# Patient Record
Sex: FEMALE | Race: WHITE | NOT HISPANIC OR LATINO | Employment: OTHER | URBAN - METROPOLITAN AREA
[De-identification: names, ages, dates, MRNs, and addresses within clinical notes are randomized per-mention and may not be internally consistent; named-entity substitution may affect disease eponyms.]

---

## 2018-07-07 ENCOUNTER — OFFICE VISIT (OUTPATIENT)
Dept: URGENT CARE | Facility: PHYSICIAN GROUP | Age: 58
End: 2018-07-07
Payer: COMMERCIAL

## 2018-07-07 VITALS
HEIGHT: 66 IN | TEMPERATURE: 99.5 F | SYSTOLIC BLOOD PRESSURE: 104 MMHG | WEIGHT: 135 LBS | DIASTOLIC BLOOD PRESSURE: 72 MMHG | BODY MASS INDEX: 21.69 KG/M2 | HEART RATE: 82 BPM | OXYGEN SATURATION: 97 %

## 2018-07-07 DIAGNOSIS — J98.8 RTI (RESPIRATORY TRACT INFECTION): ICD-10-CM

## 2018-07-07 DIAGNOSIS — J03.90 TONSILLITIS: ICD-10-CM

## 2018-07-07 PROCEDURE — 99214 OFFICE O/P EST MOD 30 MIN: CPT | Performed by: FAMILY MEDICINE

## 2018-07-07 RX ORDER — LEVOTHYROXINE SODIUM 0.07 MG/1
75 TABLET ORAL
COMMUNITY
End: 2022-04-01 | Stop reason: SDUPTHER

## 2018-07-07 RX ORDER — AZITHROMYCIN 250 MG/1
TABLET, FILM COATED ORAL
Qty: 6 TAB | Refills: 0 | Status: SHIPPED | OUTPATIENT
Start: 2018-07-07

## 2018-07-07 ASSESSMENT — ENCOUNTER SYMPTOMS
SPUTUM PRODUCTION: 0
CHILLS: 0
FOCAL WEAKNESS: 0
COUGH: 1
DIZZINESS: 0
FEVER: 0
SORE THROAT: 1

## 2018-07-07 NOTE — PROGRESS NOTES
"Subjective:      Shahnaz Valencia is a 58 y.o. female who presents with Pharyngitis (Onset Wed)    Chief Complaint   Patient presents with   • Pharyngitis     Onset Wed        - This is a very pleasant 58 y.o. female with complaints of ST x 4 days, otc meds not helping. Hoarse and slight cough. No NVFC          ALLERGIES:  Patient has no known allergies.     PMH:  No past medical history on file.     MEDS:    Current Outpatient Prescriptions:   •  levothyroxine (SYNTHROID) 75 MCG Tab, Take 75 mcg by mouth Every morning on an empty stomach., Disp: , Rfl:   •  azithromycin (ZITHROMAX) 250 MG Tab, Use as directed, Disp: 6 Tab, Rfl: 0    ** I have documented what I find to be significant in regards to past medical, social, family and surgical history  in my HPI or under PMH/PSH/FH review section, otherwise it is contributory **           HPI    Review of Systems   Constitutional: Negative for chills and fever.   HENT: Positive for sore throat. Negative for congestion.    Respiratory: Positive for cough. Negative for sputum production.    Neurological: Negative for dizziness and focal weakness.          Objective:     /72   Pulse 82   Temp 37.5 °C (99.5 °F)   Ht 1.676 m (5' 6\")   Wt 61.2 kg (135 lb)   SpO2 97%   BMI 21.79 kg/m²      Physical Exam   Constitutional: She appears well-developed. No distress.   HENT:   Head: Normocephalic and atraumatic.   Mouth/Throat: Oropharyngeal exudate:  injected Grade II b/l    Eyes: Conjunctivae are normal.   Neck: Neck supple.   Cardiovascular: Regular rhythm.    No murmur heard.  Pulmonary/Chest: Effort normal and breath sounds normal. No respiratory distress.   Neurological: She is alert. She exhibits normal muscle tone.   Skin: Skin is warm and dry.   Psychiatric: She has a normal mood and affect. Judgment normal.   Nursing note and vitals reviewed.              Assessment/Plan:         1. RTI (respiratory tract infection)  azithromycin (ZITHROMAX) 250 MG Tab   2. " Tonsillitis  azithromycin (ZITHROMAX) 250 MG Tab             Dx & d/c instructions discussed w/ patient and/or family members. Follow up w/ Prvt Dr or here in 3-4 days if not getting better, sooner if needed,  ER if worse and UC/PCP unavailable.        Possible side effects (i.e. Rash, GI upset/constipation, sedation, elevation of BP or sugars) of any medications given discussed.

## 2018-07-07 NOTE — LETTER
July 7, 2018         Patient: Shahnaz Valencia   YOB: 1960   Date of Visit: 7/7/2018           To Whom it May Concern:    Shahnaz Valencia was seen in my clinic on 7/7/2018. She may return to work in 1-3 days.    If you have any questions or concerns, please don't hesitate to call.        Sincerely,           Oliver Granda M.D.  Electronically Signed

## 2022-04-04 RX ORDER — LEVOTHYROXINE SODIUM 0.07 MG/1
75 TABLET ORAL
Qty: 30 TABLET | Refills: 2 | Status: SHIPPED | OUTPATIENT
Start: 2022-04-04

## 2022-12-14 ENCOUNTER — HOSPITAL ENCOUNTER (EMERGENCY)
Facility: MEDICAL CENTER | Age: 62
End: 2022-12-14
Attending: EMERGENCY MEDICINE
Payer: MEDICARE

## 2022-12-14 VITALS
HEIGHT: 65 IN | BODY MASS INDEX: 23.32 KG/M2 | HEART RATE: 81 BPM | TEMPERATURE: 97.3 F | DIASTOLIC BLOOD PRESSURE: 67 MMHG | WEIGHT: 140 LBS | OXYGEN SATURATION: 94 % | RESPIRATION RATE: 14 BRPM | SYSTOLIC BLOOD PRESSURE: 154 MMHG

## 2022-12-14 DIAGNOSIS — H53.9 VISUAL CHANGES: ICD-10-CM

## 2022-12-14 PROCEDURE — 99283 EMERGENCY DEPT VISIT LOW MDM: CPT

## 2022-12-14 RX ORDER — PROPARACAINE HYDROCHLORIDE 5 MG/ML
1 SOLUTION/ DROPS OPHTHALMIC ONCE
Status: DISCONTINUED | OUTPATIENT
Start: 2022-12-14 | End: 2022-12-14 | Stop reason: HOSPADM

## 2022-12-14 NOTE — ED TRIAGE NOTES
Shahnaz Rodgersenzuela  62 y.o. female  Chief Complaint   Patient presents with    Eye Injury     EMS transfer from [facility] for workup for possible retinal detachment. L eye flashing and clouding of vision onset yesterday, constant atraumatic onset. Hx MS. GCS 15.      Pt BIB EMS for above complaint.    Pt is GCS 15, speaking in full sentences, follows commands and responds appropriately to questions. Resp are even and unlabored. Patient denies pain.       This RN masked and in appropriate PPE during encounter.     Vitals:    12/14/22 1344   BP: 127/62   Pulse: 66   Resp: 14   Temp: 36.4 °C (97.5 °F)   SpO2: 94%
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
